# Patient Record
Sex: FEMALE | Race: WHITE | URBAN - METROPOLITAN AREA
[De-identification: names, ages, dates, MRNs, and addresses within clinical notes are randomized per-mention and may not be internally consistent; named-entity substitution may affect disease eponyms.]

---

## 2017-11-20 ENCOUNTER — OFFICE VISIT (OUTPATIENT)
Dept: DERMATOLOGY | Facility: AMBULATORY SURGERY CENTER | Age: 52
End: 2017-11-20

## 2017-11-20 ENCOUNTER — HOSPITAL ENCOUNTER (OUTPATIENT)
Dept: LAB | Age: 52
Discharge: HOME | End: 2017-11-20

## 2017-11-20 VITALS
SYSTOLIC BLOOD PRESSURE: 144 MMHG | RESPIRATION RATE: 18 BRPM | TEMPERATURE: 97.7 F | OXYGEN SATURATION: 97 % | BODY MASS INDEX: 29.62 KG/M2 | HEIGHT: 69 IN | HEART RATE: 72 BPM | DIASTOLIC BLOOD PRESSURE: 86 MMHG | WEIGHT: 200 LBS

## 2017-11-20 DIAGNOSIS — D18.01 CHERRY ANGIOMA: ICD-10-CM

## 2017-11-20 DIAGNOSIS — L82.1 SEBORRHEIC KERATOSES: ICD-10-CM

## 2017-11-20 DIAGNOSIS — D48.5 NEOPLASM OF UNCERTAIN BEHAVIOR OF SKIN OF CHEST: Primary | ICD-10-CM

## 2017-11-20 DIAGNOSIS — L81.4 LENTIGINES: ICD-10-CM

## 2017-11-20 DIAGNOSIS — D22.9 MULTIPLE BENIGN NEVI: ICD-10-CM

## 2017-11-20 NOTE — PROGRESS NOTES
Name: Benigno Mixon       Age: 47 y.o. Date: 11/20/2017    Chief Complaint:   Chief Complaint   Patient presents with    Skin Exam     NP       Subjective:    HPI  Ms. Benigno Mixon is a 47 y.o. female who presents as a new patient to [place] [address] for a skin exam.  The patient was referred for this evaluation by herself. The patient has not had a skin exam in the past and the patient does have current complaints related to her skin. She reports a new lesion on the left arm that changed quickly - scaling and pink. This has improved some. She also reports a lesion on the right chest x 1 month that has not resolved. Redness on her nose for many months and brown spots on the legs she has noted. The patient's pertinent skin history includes: no personal history of skin cancer    ROS: Constitutional: Negative. Dermatological : positive for - skin lesion changes      Social History     Social History    Marital status:      Spouse name: N/A    Number of children: N/A    Years of education: N/A     Occupational History    Not on file.      Social History Main Topics    Smoking status: Never Smoker    Smokeless tobacco: Never Used    Alcohol use No    Drug use: Not on file    Sexual activity: Not on file     Other Topics Concern    Not on file     Social History Narrative       Family History   Problem Relation Age of Onset    Cancer Mother     Diabetes Mother     Hypertension Mother     High Cholesterol Mother        Past Medical History:   Diagnosis Date    Crohn disease     Other and unspecified hyperlipidemia     PIH (PREGNANCY INDUCED HYPERTENSION) 2003    vag delivery    PIH (PREGNANCY INDUCED HYPERTENSION) 1/2003    induced vaginal delivery    Sunburn, blistering     high school/ shoulders       Past Surgical History:   Procedure Laterality Date    ENDOSCOPY, COLON, DIAGNOSTIC         Current Outpatient Prescriptions   Medication Sig Dispense Refill    D-METHORPHAN/PE/ACETAMINOPHEN (STARR-SELTZER PLUS DAY PO) Take  by mouth.  MESALAMINE (PENTASA PO) Take  by mouth.  fluticasone (FLONASE) 50 mcg/Actuation nasal spray 2 Sprays by Nasal route daily. 1 Bottle 5    loratadine (CLARITIN) 10 mg tablet Take 10 mg by mouth daily. Allergies   Allergen Reactions    Augmentin [Amoxicillin-Pot Clavulanate] Nausea Only    Percocet [Oxycodone-Acetaminophen] Nausea and Vomiting    Sulfa (Sulfonamide Antibiotics) Nausea and Vomiting         Objective:    Visit Vitals    /86 (BP 1 Location: Left arm, BP Patient Position: Sitting)    Pulse 72    Temp 97.7 °F (36.5 °C) (Oral)    Resp 18    Ht 5' 9\" (1.753 m)    Wt 90.7 kg (200 lb)    SpO2 97%    BMI 29.53 kg/m2       Carito Degroot is a 47 y.o. female who appears well and in no distress. She is awake, alert, and oriented. There is no preauricular, submandibular, or cervical lymphadenopathy. A skin examination was performed including her scalp, face (including eyelid), ears, neck, chest, back, abdomen, upper extremities (including digits/nails), lower extremities, breasts, buttocks; genital skin was not examined. She has mid facial - mostly nasal- redness with some pustules and perifollicular scaling - most consistent with demodex rosacea. She has scattered cherry angiomas and seborrheic keratoses (including pink inflamed SK on the left arm). She has lentigines. There is a dermatofibroma on the left shin. She has few nevi - none with concerning features for severe atypia. There is a pink shiny papule, 4 x 3 mm in size on the right chest, concerning for inflamed SK vs BCC. Assessment/Plan:  1. Neoplasm of Uncertain Behavior, right chest.  The differential diagnoses were discussed. A shave biopsy was advised to sample this lesion. The procedure was reviewed and verbal and written consent were obtained. The risks of pain, bleeding, infection, and scar were discussed.   The patient is aware that this is a sample and is intended for diagnosis and not therapy of the skin lesion. I performed the procedure. The site was cleansed and anesthetized with 1% Lidocaine with Epinephrine 1:100,000. A shave biopsy was performed to sample the lesion. Drysol was used for hemostasis. The wound was bandaged and care reviewed. The specimen was sent to pathology. I will contact the patient with the results and any further treatment that may be necessary. 2.Solar lentigos. The diagnosis and relationship to sun exposure was reviewed. Sun protection advised. 3.Seborrheic keratoses. The diagnosis was reviewed and the patient was reassured that no treatment is needed for these benign lesions. 4.Cherry angiomas. The diagnosis was reviewed and the patient was reassured that no treatment is needed for these benign lesions. 5.Normal nevi. The diagnosis of normal nevi was reviewed. I discussed sun protection, sunscreen use, the warning signs of skin cancer, the need for self-skin examinations, and the need for regular practitioner exams every 1 year. The patient should follow up sooner as needed if new, changing, or symptomatic skin lesions arise. [facility]   OFFICE PROCEDURE PROGRESS NOTE   Chart reviewed for the following:   Tony STARKEY, have reviewed the History, Physical and updated the Allergic reactions for [facility]. TIME OUT performed immediately prior to start of procedure:   Viry STARKEY, have performed the following reviews on [facility]   prior to the start of the procedure:     * Patient was identified by name and date of birth   * Agreement on procedure being performed was verified   * Risks and Benefits explained to the patient   * Procedure site verified and marked as necessary   * Patient was positioned for comfort   * Consent was signed and verified     Time: 1481  Date of procedure: 11/20/2017  Procedure performed by: Preston Dennis.  Germaine Addison  Provider assisted by: nataly   Patient assisted by: self   How tolerated by patient: tolerated the procedure well with no complications   Comments: none

## 2017-11-20 NOTE — MR AVS SNAPSHOT
Visit Information Date & Time Provider Department Dept. Phone Encounter #  
 11/20/2017 11:30 AM Abdi Kidd NP [facility] [phone] [id number] Upcoming Health Maintenance Date Due DTaP/Tdap/Td series (1 - Tdap) 9/22/1987 PAP AKA CERVICAL CYTOLOGY 9/22/1987 BREAST CANCER SCRN MAMMOGRAM 9/22/2016 FOBT Q 1 YEAR AGE 50-75 9/22/2016 Allergies as of 11/20/2017  Review Complete On: 11/20/2017 By: Idalia Miller Severity Noted Reaction Type Reactions Augmentin [Amoxicillin-pot Clavulanate]  02/21/2011    Nausea Only Percocet [Oxycodone-acetaminophen]  07/30/2010    Nausea and Vomiting  
 Sulfa (Sulfonamide Antibiotics)  08/02/2010    Nausea and Vomiting Current Immunizations  Reviewed on 11/20/2017 Name Date Influenza Vaccine 11/17/2017 Reviewed by Harry Carrero on 11/20/2017 at 12:46 PM  
Vitals BP Pulse Temp Resp Height(growth percentile) Weight(growth percentile) 144/86 (BP 1 Location: Left arm, BP Patient Position: Sitting) 72 97.7 °F (36.5 °C) (Oral) 18 5' 9\" (1.753 m) 200 lb (90.7 kg) SpO2 BMI Smoking Status 97% 29.53 kg/m2 Never Smoker BMI and BSA Data Body Mass Index Body Surface Area  
 29.53 kg/m 2 2.1 m 2 Preferred Pharmacy Pharmacy Name Phone [id number] [address] [phone] Your Updated Medication List  
  
   
This list is accurate as of: 11/20/17 12:55 PM.  Always use your most recent med list. STARR-SELTZER PLUS DAY PO Take  by mouth. CLARITIN 10 mg tablet Generic drug:  loratadine Take 10 mg by mouth daily. fluticasone 50 mcg/actuation nasal spray Commonly known as:  Caffie Euler 2 Sprays by Nasal route daily. PENTASA PO Take  by mouth. Patient Instructions Self Skin Exam and Sunscreens Early detection and treatment is essential in the treatment of all forms of skin cancer. If caught early, all forms of skin cancer are curable. In addition to your regular visits, you should perform a monthly skin examination. Over time, you become familiar with what is normally found on your skin and can identify new or suspicious spots. One of the screening tools you can use to assess your skin is to follow the ABCDEs: 
 
A= Asymmetry (One half is unlike the other half) B= Border (An irregular, scalloped or poorly defined edge) C= Color (Is varied from one area to another, has shades of tan, brown/ black,       white, red or blue) D= Diameter (Spots larger than 6mm or a pencil eraser) E= Evolving (New spots or one that is changing in size, shape, or color) A follow- up interval will be customized based on your history of skin cancer or level of skin damage and risk factors. In any case, if you notice a suspicious or new spot, an appointment should be arranged between regular visits. Everyone should use sunscreen and sun-safe practices, which is especially important for those with a personal or family history of skin cancer. Suggestions for this include: 1. Use daily moisturizers containing SPF 30 or higher. 2. Wear long sleeve clothing with UPF ratings and a broad-brimmed hat. 3. Apply sunscreen with SPF 30 or higher to all sun exposed areas if you are going to be in the sun. A broad spectrum UVA/ UVB sunscreen is best.  Dont forget to REAPPLY every two hours or more often if swimming or sweating! 4. Avoid outside activities during peak sun hours, especially in the summer (10am- 2pm). 5. DO NOT use tanning beds. Using sunscreen and sun-safe practices can help reduce the likelihood of developing skin cancer or additional skin cancers in those previously diagnosed. Introducing [facility]!    
 Nancy Walter introduces [employer] patient portal. Now you can access parts of your medical record, email your doctor's office, and request medication refills online. 1. In your internet browser, go to [email or web address] 2. Click on the First Time User? Click Here link in the Sign In box. You will see the New Member Sign Up page. 3. Enter your Page[employer] Access Code exactly as it appears below. You will not need to use this code after youve completed the sign-up process. If you do not sign up before the expiration date, you must request a new code. · Page[employer] Access Code: [email or web address] Expires: 2/18/2018  9:32 AM 
 
4. Enter the last four digits of your Social Security Number (xxxx) and Date of Birth (mm/dd/yyyy) as indicated and click Submit. You will be taken to the next sign-up page. 5. Create a PageFreezer ID. This will be your PageFreezer login ID and cannot be changed, so think of one that is secure and easy to remember. 6. Create a Page[employer] password. You can change your password at any time. 7. Enter your Password Reset Question and Answer. This can be used at a later time if you forget your password. 8. Enter your e-mail address. You will receive e-mail notification when new information is available in [address] 9. Click Sign Up. You can now view and download portions of your medical record. 10. Click the Download Summary menu link to download a portable copy of your medical information. If you have questions, please visit the Frequently Asked Questions section of the Page[employer] website. Remember, Page[employer] is NOT to be used for urgent needs. For medical emergencies, dial 911. Now available from your iPhone and Android! Please provide this summary of care documentation to your next provider. Your primary care clinician is listed as Phys Other. If you have any questions after today's visit, please call [phone].

## 2017-11-20 NOTE — PROGRESS NOTES
Chief Complaint   Patient presents with    Skin Exam     NP     1. Have you been to the ER, urgent care clinic since your last visit? Hospitalized since your last visit? No    2. Have you seen or consulted any other health care providers outside of the [address] [place] [address] since your last visit? Include any pap smears or colon screening. [facility] for referral for today's visit. Mammogram 9/22/2017 [facility].

## 2017-12-01 ENCOUNTER — TELEPHONE (OUTPATIENT)
Dept: DERMATOLOGY | Facility: AMBULATORY SURGERY CENTER | Age: 52
End: 2017-12-01

## 2017-12-01 NOTE — TELEPHONE ENCOUNTER
Patient called returning your phone call. Asked if you could please give her phone call back.       Thanks,

## 2018-04-10 ENCOUNTER — TELEPHONE (OUTPATIENT)
Dept: DERMATOLOGY | Facility: AMBULATORY SURGERY CENTER | Age: 53
End: 2018-04-10

## 2018-04-10 ENCOUNTER — DOCUMENTATION ONLY (OUTPATIENT)
Dept: DERMATOLOGY | Facility: AMBULATORY SURGERY CENTER | Age: 53
End: 2018-04-10

## 2018-04-10 NOTE — PROGRESS NOTES
Certified letter returned undeliverable. LM on home number again and tried to call next of kin which has a different name on the voicemail than Shameka Messer.

## 2019-03-15 ENCOUNTER — TELEPHONE (OUTPATIENT)
Dept: DERMATOLOGY | Facility: AMBULATORY SURGERY CENTER | Age: 54
End: 2019-03-15

## 2019-03-15 NOTE — TELEPHONE ENCOUNTER
Patient called about bx from 2017. Did not get my phone messages or certified letter. I confirmed her address is correct in the chart. She explains that since that visit her dad has  from cancer and her mother is going through tx for cancer. She is just getting around to following up on this. Her insurance has changed since her appt and now is not accepted through bs. Explained she can see another dermatologist and I gave her suggestions. I will mail her the pathology, my note and the photo from the visit so she can have the area assessed for potential needed further tx. She is agreeable to this plan.